# Patient Record
Sex: MALE | Race: BLACK OR AFRICAN AMERICAN
[De-identification: names, ages, dates, MRNs, and addresses within clinical notes are randomized per-mention and may not be internally consistent; named-entity substitution may affect disease eponyms.]

---

## 2018-04-22 ENCOUNTER — HOSPITAL ENCOUNTER (EMERGENCY)
Dept: HOSPITAL 92 - SCSER | Age: 38
Discharge: HOME | End: 2018-04-22
Payer: SELF-PAY

## 2018-04-22 DIAGNOSIS — F17.210: ICD-10-CM

## 2018-04-22 DIAGNOSIS — S93.602A: Primary | ICD-10-CM
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
## 2018-04-22 DIAGNOSIS — I10: ICD-10-CM

## 2018-04-22 NOTE — RAD
3 VIEWS LEFT FOOT:

 

Date:  04/22/18 

 

COMPARISON:  

None. 

 

HISTORY:  

Fell down stairs last night, trauma, pain. 

 

FINDINGS:

There is mild degenerative change at the first metatarsophalangeal joint. 

 

There is no displaced fracture or evidence of dislocation seen. 

 

IMPRESSION: 

No acute osseous abnormality. 

 

 

POS: FLAVIO

## 2023-02-13 ENCOUNTER — HOSPITAL ENCOUNTER (EMERGENCY)
Dept: HOSPITAL 92 - CSHERS | Age: 43
Discharge: HOME | End: 2023-02-13
Payer: COMMERCIAL

## 2023-02-13 DIAGNOSIS — S02.31XA: ICD-10-CM
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
## 2023-02-13 DIAGNOSIS — S02.40CA: Primary | ICD-10-CM

## 2023-02-13 DIAGNOSIS — I10: ICD-10-CM

## 2023-02-13 DIAGNOSIS — F17.210: ICD-10-CM

## 2023-02-13 DIAGNOSIS — S02.40EA: ICD-10-CM

## 2023-02-13 PROCEDURE — 70450 CT HEAD/BRAIN W/O DYE: CPT

## 2023-02-13 PROCEDURE — 70486 CT MAXILLOFACIAL W/O DYE: CPT

## 2023-02-18 ENCOUNTER — HOSPITAL ENCOUNTER (EMERGENCY)
Dept: HOSPITAL 92 - CSHERS | Age: 43
Discharge: HOME | End: 2023-02-18
Payer: SELF-PAY

## 2023-02-18 DIAGNOSIS — F17.210: ICD-10-CM

## 2023-02-18 DIAGNOSIS — S02.92XA: Primary | ICD-10-CM
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
## 2023-02-18 DIAGNOSIS — I10: ICD-10-CM

## 2023-02-18 PROCEDURE — 99283 EMERGENCY DEPT VISIT LOW MDM: CPT

## 2023-02-18 PROCEDURE — 96372 THER/PROPH/DIAG INJ SC/IM: CPT
